# Patient Record
Sex: FEMALE | Race: WHITE | Employment: FULL TIME | URBAN - METROPOLITAN AREA
[De-identification: names, ages, dates, MRNs, and addresses within clinical notes are randomized per-mention and may not be internally consistent; named-entity substitution may affect disease eponyms.]

---

## 2017-06-01 ENCOUNTER — HOSPITAL ENCOUNTER (OUTPATIENT)
Dept: CT IMAGING | Age: 52
Discharge: HOME | End: 2017-06-01
Attending: PREVENTIVE MEDICINE
Payer: OTHER MISCELLANEOUS

## 2017-06-01 ENCOUNTER — OFFICE VISIT (OUTPATIENT)
Dept: OTHER | Age: 52
End: 2017-06-01
Attending: PREVENTIVE MEDICINE
Payer: OTHER MISCELLANEOUS

## 2017-06-01 DIAGNOSIS — S00.03XA CONTUSION OF SCALP, INITIAL ENCOUNTER: Primary | ICD-10-CM

## 2017-06-01 DIAGNOSIS — S16.1XXA CERVICAL STRAIN, INITIAL ENCOUNTER: ICD-10-CM

## 2017-06-01 DIAGNOSIS — S01.01XA SCALP LACERATION, INITIAL ENCOUNTER: ICD-10-CM

## 2017-06-01 PROCEDURE — 70450 CT HEAD/BRAIN W/O DYE: CPT | Performed by: PREVENTIVE MEDICINE

## 2017-06-02 ENCOUNTER — APPOINTMENT (OUTPATIENT)
Dept: OTHER | Age: 52
End: 2017-06-02
Attending: PREVENTIVE MEDICINE
Payer: OTHER MISCELLANEOUS

## 2017-06-06 ENCOUNTER — APPOINTMENT (OUTPATIENT)
Dept: OTHER | Age: 52
End: 2017-06-06
Attending: FAMILY MEDICINE
Payer: OTHER MISCELLANEOUS

## 2017-06-13 ENCOUNTER — APPOINTMENT (OUTPATIENT)
Dept: OTHER | Age: 52
End: 2017-06-13
Attending: PREVENTIVE MEDICINE

## 2017-06-22 ENCOUNTER — APPOINTMENT (OUTPATIENT)
Dept: OTHER | Age: 52
End: 2017-06-22
Attending: PREVENTIVE MEDICINE
Payer: OTHER MISCELLANEOUS

## 2017-07-05 PROBLEM — F07.81 POST CONCUSSIVE SYNDROME: Status: ACTIVE | Noted: 2017-07-05

## 2017-07-05 PROBLEM — R42 VERTIGO: Status: ACTIVE | Noted: 2017-07-05

## 2017-07-05 NOTE — H&P
[facility] Patient / Consult Visit    Jonny Soto is a 46year old female. Referring MD: No ref.  provider found    Patient presents with:  Head Injury      HPI:    Jonny Soto is a 46year old, who more emotional than in the past.     She has not done any PT or OT. Overall, she feels not as sharp as in the past but admits things are improving;            Otherwise, patient denies any recent weight change, fevers, chills, nausea, double vision/ blurry peripheral pulses intact    Neck: Supple; full range of motion; no carotid bruits    Mental status:  Alert and oriented to time, place, person, and situation  Speech: fluent  Language: normal naming, repetition, and comprehension  Memory: normal  Attention are unremarkable. Visualized portions of orbits are unremarkable. IMPRESSION:  Unremarkable CT head.     IMPRESSION AND PLAN:   Chelsea Kong is a 46year old female with no significant PMHx, who presents for evaluation of confusion and headaches allowed to ask questions and all questions answered to the best of my ability     Marvin Malik MD, Neurology  [facility]  Pager [phone]  7/5/2017

## 2017-07-05 NOTE — PROGRESS NOTES
Hit head at work 6/1/17. Dizziness intermittent with turning head too fast, bending over. Worse episode 10 days ago. Feels off balance constantly. Also having headaches intermittently since injury usually abates with one adivil.

## 2017-07-05 NOTE — PATIENT INSTRUCTIONS
Please set up appointment with Physical therapy  Take 600 mg Ibuprofen at onset of headache next time  Follow up in 2 months       Refill policies:    • Allow 2-3 business days for refills; controlled substances may take longer.   • Contact your pharmacy at responsible for the entire amount billed. Precertification and Prior Authorizations  If your physician has recommended that you have a procedure or additional testing performed.   [facility] ([facility]) will contact your insurance carrier

## 2017-07-11 ENCOUNTER — TELEPHONE (OUTPATIENT)
Dept: NEUROLOGY | Facility: CLINIC | Age: 52
End: 2017-07-11

## 2017-07-25 ENCOUNTER — TELEPHONE (OUTPATIENT)
Dept: NEUROLOGY | Facility: CLINIC | Age: 52
End: 2017-07-25

## 2017-07-25 NOTE — TELEPHONE ENCOUNTER
Letter requesting work restrictions from [employer]; noting that previous restrictions from past MD included avoiding strenuous activity, sudden movements or prolonged screen times. Per TE 7/11/17, no restrictions needed.     Letter pended for MD rev

## 2017-08-14 ENCOUNTER — OFFICE VISIT (OUTPATIENT)
Dept: PHYSICAL THERAPY | Age: 52
End: 2017-08-14
Attending: Other
Payer: OTHER MISCELLANEOUS

## 2017-08-14 DIAGNOSIS — R42 VERTIGO: ICD-10-CM

## 2017-08-14 DIAGNOSIS — F07.81 POST CONCUSSIVE SYNDROME: ICD-10-CM

## 2017-08-14 PROCEDURE — 97112 NEUROMUSCULAR REEDUCATION: CPT | Performed by: PHYSICAL THERAPIST

## 2017-08-14 PROCEDURE — 97162 PT EVAL MOD COMPLEX 30 MIN: CPT | Performed by: PHYSICAL THERAPIST

## 2017-08-15 NOTE — PROGRESS NOTES
CONCUSSION EVALUATION:   Referring Physician: Dr. Myrna Rasmussen  Diagnosis: Post concussive syndrome (F07.81)  Vertigo (R42)     Date of Service: 8/14/2017     PATIENT SUMMARY   Dayanna Fisher is a 46year old y/o female who presents to therapy today followi anxiety after sustaining a concussion on 6/1/17 while participating in work tasks. At this time Layla Gunn is continuing to demonstrates deficits in occulomotor function, balance, neck mobility, posture, and concentration.   Layla Gunn would benefit from skilled VOR x 1, balance EC in rhomberg on uneven surface, and cervical stretching. Patient tolerated well without exacerbation of symptoms.      Charges: PT Eval Moderate Complexity, NR x 1     Total Timed Treatment: 10 min     Total Treatment Time: 50 min   PLAN

## 2017-08-16 ENCOUNTER — APPOINTMENT (OUTPATIENT)
Dept: PHYSICAL THERAPY | Age: 52
End: 2017-08-16
Attending: Other
Payer: OTHER MISCELLANEOUS

## 2017-08-21 ENCOUNTER — OFFICE VISIT (OUTPATIENT)
Dept: PHYSICAL THERAPY | Age: 52
End: 2017-08-21
Attending: Other
Payer: OTHER MISCELLANEOUS

## 2017-08-21 PROCEDURE — 97140 MANUAL THERAPY 1/> REGIONS: CPT | Performed by: PHYSICAL THERAPIST

## 2017-08-21 PROCEDURE — 97112 NEUROMUSCULAR REEDUCATION: CPT | Performed by: PHYSICAL THERAPIST

## 2017-08-21 NOTE — PROGRESS NOTES
Dx: Post concussive syndrome (F07.81)  Vertigo (R42)          Authorized # of Visits:  Work Comp           Fall Risk: standard         Precautions: Concussion    Subjective: Lisa Barry returns for second visit.  She apologizes for missing last visit due to having step to with head turn 2x10         Walking with head turns and sequencing x 4 laps         rebounder toss with turn x 20         Balance board holds and taps x 5 min         Treadmill              Charges: NRx 2( 25 min) M x 1 ( 20 min)   Total Timed Deborah

## 2017-08-23 ENCOUNTER — OFFICE VISIT (OUTPATIENT)
Dept: PHYSICAL THERAPY | Age: 52
End: 2017-08-23
Attending: Other
Payer: OTHER MISCELLANEOUS

## 2017-08-23 PROCEDURE — 97140 MANUAL THERAPY 1/> REGIONS: CPT | Performed by: PHYSICAL THERAPIST

## 2017-08-23 PROCEDURE — 97112 NEUROMUSCULAR REEDUCATION: CPT | Performed by: PHYSICAL THERAPIST

## 2017-08-23 NOTE — PROGRESS NOTES
Dx: Post concussive syndrome (F07.81)  Vertigo (R42)          Authorized # of Visits:  Work Comp           Fall Risk: standard         Precautions: Concussion    Subjective: Babarelli Johnson states her neck is feeling much better following manipulation last session.  Abbey Turner sequencing x 4 laps        - SLS with cone touch 3x30 sec         - Tandem walking with head turns 30 feet x 2 laps        - Walking smooth pursuit hor and vert x 2 laps         - Ball catch with walking for and backwards        rebounder toss with turn x

## 2017-08-28 ENCOUNTER — OFFICE VISIT (OUTPATIENT)
Dept: PHYSICAL THERAPY | Age: 52
End: 2017-08-28
Attending: Other
Payer: OTHER MISCELLANEOUS

## 2017-08-28 PROCEDURE — 97112 NEUROMUSCULAR REEDUCATION: CPT | Performed by: PHYSICAL THERAPIST

## 2017-08-28 PROCEDURE — 97140 MANUAL THERAPY 1/> REGIONS: CPT | Performed by: PHYSICAL THERAPIST

## 2017-08-28 PROCEDURE — 97110 THERAPEUTIC EXERCISES: CPT | Performed by: PHYSICAL THERAPIST

## 2017-08-28 NOTE — PROGRESS NOTES
Dx: Post concussive syndrome (F07.81)  Vertigo (R42)          Authorized # of Visits:  Work Comp           Fall Risk: standard         Precautions: Concussion    Subjective: Brigid Campos states she had a great day on Wednesday following session.  She had increased Walking with head turns and sequencing x 4 laps Walking with head turns and sequencing x 4 laps Walking with head turns and sequencing x 4 laps       - SLS with cone touch 3x30 sec  SLS with cone touch 3x30 sec        - Tandem walking with head turns 30

## 2017-08-30 ENCOUNTER — OFFICE VISIT (OUTPATIENT)
Dept: PHYSICAL THERAPY | Age: 52
End: 2017-08-30
Attending: Other
Payer: OTHER MISCELLANEOUS

## 2017-08-30 PROCEDURE — 97140 MANUAL THERAPY 1/> REGIONS: CPT | Performed by: PHYSICAL THERAPIST

## 2017-08-30 PROCEDURE — 97110 THERAPEUTIC EXERCISES: CPT | Performed by: PHYSICAL THERAPIST

## 2017-08-30 PROCEDURE — 97112 NEUROMUSCULAR REEDUCATION: CPT | Performed by: PHYSICAL THERAPIST

## 2017-08-30 NOTE — PROGRESS NOTES
Jen Progress Note    HPI  Patient presents with:  Post-Concussion Syndrome      As per my initial H&P from 7/5/17:   Leigh Mar is a 46year old, who presents for evaluation of head injury.       She states she has been fe Overall, she feels not as sharp as in the past but admits things are improving;            Otherwise, patient denies any recent weight change, fevers, chills, nausea, double vision/ blurry vision / loss of vision, chest pain, palpitations, shortness of robbi daily  Exercise                Yes    Comment:walking          Dairy Products          Other (See Comments)    Comment:Stomach issues  Gluten Flour            Itching    Comment:Skin and throat  Penicillins                 Comment:Family history      Jian parenchyma are unremarkable.     Ventricles and sulci are appropriate for the patient's age.     The No mass effect.     Mild mucoperiosteal thickening of the ethmoid air cells is noted.     Visualized portions of mastoid air cells are unremarkable.     Vi

## 2017-08-30 NOTE — PROGRESS NOTES
Dx: Post concussive syndrome (F07.81)  Vertigo (R42)          Authorized # of Visits:  Work Comp           Fall Risk: standard         Precautions: Concussion    Subjective:  Sherwin Puente states she had a follow-up with her neurologist who states she is progressing Walking with head turns and sequencing x 4 laps Walking with head turns and sequencing x 4 laps Walking with head turns and sequencing x 4 laps TRX row 2x15      - SLS with cone touch 3x30 sec  SLS with cone touch 3x30 sec  -      - Tandem walking with

## 2017-08-30 NOTE — PATIENT INSTRUCTIONS
Follow up in 1 month   Continue with PT     After your visit at the [facility] office  today,  please direct any follow up questions or medication needs to the staff in our  [place] office so that your concerns may be promptly addressed.   We are availa do not schedule the test until this office has notified you that the test has been approved by your insurer. Depending on your insurance carrier, approval may take 3-10 days.  It is highly recommended patients contact their insurance carrier directly to de

## 2017-09-06 ENCOUNTER — APPOINTMENT (OUTPATIENT)
Dept: PHYSICAL THERAPY | Age: 52
End: 2017-09-06
Attending: Other
Payer: OTHER MISCELLANEOUS

## 2017-09-11 ENCOUNTER — APPOINTMENT (OUTPATIENT)
Dept: PHYSICAL THERAPY | Age: 52
End: 2017-09-11
Attending: Other
Payer: OTHER MISCELLANEOUS

## 2017-09-13 ENCOUNTER — OFFICE VISIT (OUTPATIENT)
Dept: PHYSICAL THERAPY | Age: 52
End: 2017-09-13
Attending: FAMILY MEDICINE
Payer: OTHER MISCELLANEOUS

## 2017-09-13 PROCEDURE — 97140 MANUAL THERAPY 1/> REGIONS: CPT | Performed by: PHYSICAL THERAPIST

## 2017-09-13 PROCEDURE — 97110 THERAPEUTIC EXERCISES: CPT | Performed by: PHYSICAL THERAPIST

## 2017-09-13 NOTE — PROGRESS NOTES
Dx: Post concussive syndrome (F07.81)  Vertigo (R42)          Authorized # of Visits:  Work Comp           Fall Risk: standard         Precautions: Concussion    Subjective: La Mendez reports she injured her ankle 2 weeks ago and was in a boot for 2 weeks.  This left x 2  X 1 right     EC romberg 2x30 sec  EC romberg, modified tandem, regular stance EC romberg, modified tandem, regular stance Tandem airex walk with counting challenge -     Airex step to with head turn 2x10 x10 - TRX squats 2x15   3x10     Walking

## 2017-09-18 ENCOUNTER — OFFICE VISIT (OUTPATIENT)
Dept: PHYSICAL THERAPY | Age: 52
End: 2017-09-18
Attending: FAMILY MEDICINE
Payer: OTHER MISCELLANEOUS

## 2017-09-18 PROCEDURE — 97140 MANUAL THERAPY 1/> REGIONS: CPT | Performed by: PHYSICAL THERAPIST

## 2017-09-18 PROCEDURE — 97110 THERAPEUTIC EXERCISES: CPT | Performed by: PHYSICAL THERAPIST

## 2017-09-18 NOTE — PROGRESS NOTES
Progress Summary    Pt has attended 7, cancelled 1, and no shown 0 visits in Physical Therapy.      Dx: Post concussive syndrome (F07.81)  Vertigo (R42)          Authorized # of Visits:  Work Comp                    Precautions: Concussion  Subjective: [employer] sec; L back EO 30 sec, EC 15 sec   SLS: R EO 15 sec, EC 0 sec; L EO 15 sec, EC 4 sec    Functional Mobility:  Functional Mobility/Gait:no abnormalities  Exertional tolerance (Biking,running, sports, etc): some neck symptoms with HR above 100 bpm.    Goals: occipitals x 5 min X 5 min with pin and stretch X 3 min X 3 min X 3 min X 5 min    Prone effleurage/petrosage to paraspinals/back  X 5 min X 4 min Pin and stretch UT x 3 min X 3 min -    Traction 3x30 sec  3x60 sec  3x30 sec  2x30 sec  3x30 sec  3x30 sec

## 2017-09-20 ENCOUNTER — OFFICE VISIT (OUTPATIENT)
Dept: PHYSICAL THERAPY | Age: 52
End: 2017-09-20
Attending: FAMILY MEDICINE
Payer: OTHER MISCELLANEOUS

## 2017-09-20 PROCEDURE — 97110 THERAPEUTIC EXERCISES: CPT | Performed by: PHYSICAL THERAPIST

## 2017-09-20 PROCEDURE — 97140 MANUAL THERAPY 1/> REGIONS: CPT | Performed by: PHYSICAL THERAPIST

## 2017-09-20 NOTE — PROGRESS NOTES
Dx: Post concussive syndrome (F07.81)  Vertigo (R42)          Authorized # of Visits:  Work Comp                    Precautions: Concussion  Subjective: [facility] reports an increase in her headache yesterday. Seems to correlate with Monday's session.  Her h Cervical upslope TJM to right x 1 To left x 2  X 1 right X 1 left upslope Supine chin tucks with assist x 20   EC romberg 2x30 sec  EC romberg, modified tandem, regular stance EC romberg, modified tandem, regular stance Tandem airex walk with counting chal

## 2017-09-25 ENCOUNTER — OFFICE VISIT (OUTPATIENT)
Dept: PHYSICAL THERAPY | Age: 52
End: 2017-09-25
Attending: FAMILY MEDICINE
Payer: OTHER MISCELLANEOUS

## 2017-09-25 PROCEDURE — 97110 THERAPEUTIC EXERCISES: CPT | Performed by: PHYSICAL THERAPIST

## 2017-09-25 PROCEDURE — 97140 MANUAL THERAPY 1/> REGIONS: CPT | Performed by: PHYSICAL THERAPIST

## 2017-09-25 NOTE — PROGRESS NOTES
Dx: Post concussive syndrome (F07.81)  Vertigo (R42)          Authorized # of Visits:  Work Comp                    Precautions: Concussion  Subjective:  Aditi reports decrease in her headaches and has only had 2 episodes of dizziness over the last 2 wee Single arm rotational row      TRX push-up 2x10 Plank 2x30 sec  - Child's pose 3x30 sec      ITY with YTB 2x15 2x15 x20 x20     Therapeutic neuroscience education x 8 min - TRX mid-low back stretch with rotation x 20 x10 with 10 sec hold      Cervical PIVM

## 2017-09-27 ENCOUNTER — OFFICE VISIT (OUTPATIENT)
Dept: PHYSICAL THERAPY | Age: 52
End: 2017-09-27
Attending: FAMILY MEDICINE
Payer: OTHER MISCELLANEOUS

## 2017-09-27 PROCEDURE — 97110 THERAPEUTIC EXERCISES: CPT | Performed by: PHYSICAL THERAPIST

## 2017-09-27 PROCEDURE — 97140 MANUAL THERAPY 1/> REGIONS: CPT | Performed by: PHYSICAL THERAPIST

## 2017-09-27 NOTE — PROGRESS NOTES
Dx: Post concussive syndrome (F07.81)  Vertigo (R42)          Authorized # of Visits:  Work Comp                    Precautions: Concussion  Subjective:  Aditi 7-8/10 headache that began yesterday morning and increased as the day went on in to last night SCM  - STM to suboccipitals x 5 min    X 3 min - X 5 min treadmill  X 5 min Elliptical      3x30 sec  3x30 sec  2x30 sec  UT stretch 2x30 sec  UT stretch 2x30 sec     X 1 right X 1 left upslope Supine chin tucks with assist x 20 - Supine W position diaphra

## 2017-09-29 ENCOUNTER — APPOINTMENT (OUTPATIENT)
Dept: PHYSICAL THERAPY | Age: 52
End: 2017-09-29
Payer: OTHER MISCELLANEOUS

## 2017-10-02 ENCOUNTER — APPOINTMENT (OUTPATIENT)
Dept: PHYSICAL THERAPY | Age: 52
End: 2017-10-02
Payer: OTHER MISCELLANEOUS

## 2017-10-04 ENCOUNTER — APPOINTMENT (OUTPATIENT)
Dept: PHYSICAL THERAPY | Age: 52
End: 2017-10-04
Payer: OTHER MISCELLANEOUS

## 2017-10-06 NOTE — PROGRESS NOTES
[employer] Progress Note    HPI  Patient presents with:  Post-Concussion Syndrome: Follow up      As per my initial H&P from 7/5/17:   Dom Gibson is a 46year old, who presents for evaluation of head injury.       She states she OT.  Overall, she feels not as sharp as in the past but admits things are improving;            Otherwise, patient denies any recent weight change, fevers, chills, nausea, double vision/ blurry vision / loss of vision, chest pain, palpitations, shortness of Comment:2-3 x a week          Dairy Products          Other (See Comments)    Comment:Stomach issues  Gluten Flour            Itching    Comment:Skin and throat  Penicillins                 Comment:Family history      Current Outpatient Prescriptions:   • extra-axial fluid collection.     Supra and infratentorial brain parenchyma are unremarkable.     Ventricles and sulci are appropriate for the patient's age.     The No mass effect.     Mild mucoperiosteal thickening of the ethmoid air cells is noted.

## 2017-10-06 NOTE — PATIENT INSTRUCTIONS
Refill policies:    • Allow 2-3 business days for refills; controlled substances may take longer.   • Contact your pharmacy at least 5 days prior to running out of medication and have them send an electronic request or submit request through the [facility] have a procedure or additional testing performed. [facility]) will contact your insurance carrier to obtain pre-certification or prior authorization.     Unfortunately, GABRIEL has seen an increase in denial of payment even though the p

## 2017-10-09 ENCOUNTER — TELEPHONE (OUTPATIENT)
Dept: SURGERY | Facility: CLINIC | Age: 52
End: 2017-10-09

## 2017-10-09 NOTE — TELEPHONE ENCOUNTER
Called [employer] insurance with information requested. Per Dr. Rima Eldridge, no restrictions. Requested LOV notes be faxed to [phone]. Notes faxed with receipt confirmation.

## 2017-10-09 NOTE — TELEPHONE ENCOUNTER
Per Dr. Juve Lee note:     García Horta is a 45year old woman with no significant PMHx , who initially presented 7/2017, for evaluation of headaches, and memory issues, following head injury 6/2017.           Neurologic exam remains normal and nonfoc

## 2017-10-11 ENCOUNTER — APPOINTMENT (OUTPATIENT)
Dept: PHYSICAL THERAPY | Age: 52
End: 2017-10-11
Attending: FAMILY MEDICINE
Payer: COMMERCIAL

## 2019-02-27 ENCOUNTER — HOSPITAL ENCOUNTER (EMERGENCY)
Age: 54
Discharge: HOME | End: 2019-02-27
Attending: EMERGENCY MEDICINE
Payer: COMMERCIAL

## 2019-02-27 VITALS
DIASTOLIC BLOOD PRESSURE: 64 MMHG | TEMPERATURE: 98 F | SYSTOLIC BLOOD PRESSURE: 124 MMHG | HEART RATE: 70 BPM | HEIGHT: 62 IN | OXYGEN SATURATION: 100 % | RESPIRATION RATE: 18 BRPM | BODY MASS INDEX: 20.24 KG/M2 | WEIGHT: 110 LBS
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
## 2019-02-27 DIAGNOSIS — S01.351A DOG BITE OF RIGHT EAR, INITIAL ENCOUNTER: Primary | ICD-10-CM

## 2019-02-27 PROCEDURE — 99284 EMERGENCY DEPT VISIT MOD MDM: CPT

## 2019-02-27 PROCEDURE — 99283 EMERGENCY DEPT VISIT LOW MDM: CPT

## 2019-02-27 PROCEDURE — 12011 RPR F/E/E/N/L/M 2.5 CM/<: CPT

## 2019-02-27 RX ORDER — AMOXICILLIN AND CLAVULANATE POTASSIUM 875; 125 MG/1; MG/1
1 TABLET, FILM COATED ORAL 2 TIMES DAILY
Qty: 20 TABLET | Refills: 0 | Status: SHIPPED | OUTPATIENT
Start: 2019-02-27 | End: 2019-03-09

## 2019-02-27 RX ORDER — AMOXICILLIN AND CLAVULANATE POTASSIUM 875; 125 MG/1; MG/1
1 TABLET, FILM COATED ORAL ONCE
Status: COMPLETED | OUTPATIENT
Start: 2019-02-27 | End: 2019-02-27

## 2019-02-28 NOTE — ED PROVIDER NOTES
Patient Seen in: [facility] In [place]    History   Patient presents with:  Bite (integumentary)    Stated Complaint: DOG BITE RIGHT EAR    HPI    Superficial bite suffered tonight to the right pinna from family dog.   Shots up-to-date cleansed. The patient is given Augmentin as infection prophylaxis. After a period of direct pressure bleeding subsided and then were sealed with Dermabond.   Discharged home with Dermabond instructions and advised to follow-up if signs of infection develo

## 2023-05-17 NOTE — LETTER
17          Liliana Cifuentes  :  1965      To Whom It May Concern: This patient was seen in our office on 17 . At this time she may return to work with no restrictions.     If this office may be of further assistance, please do not he no

## (undated) NOTE — LETTER
07/05/17    Dear Dr. Stacey Mcnally saw your patient, Corina Cadet for an initial visit. Please see my H&P note enclosed below. Let me know if you have any questions.     Thank you  Deisi Rodriguez MD, Neurology  [facility] dysequilibrium) when sitting or standing, worse when bending over and then standing up; denies nausea/emesis but has some light / sound / smell sensitivity; denies double vision or loss of vision; feels her headaches are intermittent and last one was a abo /80 (BP Location: Left arm, Patient Position: Sitting, Cuff Size: adult)   Pulse 64   Resp 12   Wt 109 lb   BMI 19.93 kg/m²   Estimated body mass index is 19.93 kg/m² as calculated from the following:    Height as of 8/7/16: 62.01\".     Weight as of Heel to shin is normal bilaterally    DTRs:   2+, symmetric, throughout, toes downgoing bilaterally; no clonus          Gait:  Normal casual, heel, toe and tandem gait    TEST RESULTS/DATA REVIEWED:   CT brain: (6/1/17):      FINDINGS:  No evidence of hemor (F07.81) Post concussive syndrome  (primary encounter diagnosis)  Plan: OP REFERRAL TO [facility]        As noted above    (R42) Vertigo  Plan: OP REFERRAL TO [facility]  - as noted above            Return in about

## (undated) NOTE — ED AVS SNAPSHOT
Patricia Mendez   MRN: [id number]    Department:  [facility] Emergency Department in [address] [place] [address]   Date of Visit:  2/27/2019           Disclosure     Insurance plans vary and the physician(s) referred by the ER may not be covered by your plan.  Please conta tell this physician (or your personal doctor if your instructions are to return to your personal doctor) about any new or lasting problems. The primary care or specialist physician will see patients referred from the [facility] Emergency Department.  Susy Logan